# Patient Record
Sex: MALE | Race: WHITE | NOT HISPANIC OR LATINO
[De-identification: names, ages, dates, MRNs, and addresses within clinical notes are randomized per-mention and may not be internally consistent; named-entity substitution may affect disease eponyms.]

---

## 2022-07-29 PROBLEM — Z00.00 ENCOUNTER FOR PREVENTIVE HEALTH EXAMINATION: Status: ACTIVE | Noted: 2022-07-29

## 2022-08-01 ENCOUNTER — APPOINTMENT (OUTPATIENT)
Dept: ORTHOPEDIC SURGERY | Facility: CLINIC | Age: 62
End: 2022-08-01

## 2022-08-01 ENCOUNTER — FORM ENCOUNTER (OUTPATIENT)
Age: 62
End: 2022-08-01

## 2022-08-01 VITALS — BODY MASS INDEX: 35.78 KG/M2 | WEIGHT: 270 LBS | HEIGHT: 73 IN

## 2022-08-01 DIAGNOSIS — I10 ESSENTIAL (PRIMARY) HYPERTENSION: ICD-10-CM

## 2022-08-01 DIAGNOSIS — M25.539 PAIN IN UNSPECIFIED WRIST: ICD-10-CM

## 2022-08-01 PROCEDURE — 73110 X-RAY EXAM OF WRIST: CPT | Mod: LT

## 2022-08-01 PROCEDURE — 99203 OFFICE O/P NEW LOW 30 MIN: CPT

## 2022-08-01 NOTE — HISTORY OF PRESENT ILLNESS
[4] : 4 [0] : 0 [Sharp] : sharp [Intermittent] : intermittent [] : no [FreeTextEntry1] : left wrist [FreeTextEntry5] : 8/1 No injury, patient noticed a lump in his wrist and wants to get it evaluated.

## 2022-08-01 NOTE — PHYSICAL EXAM
[Right] : right hand [Volar Wrist] : volar wrist [Cystic] : cystic [Left] : left wrist [No acute displaced fracture or dislocation] : No acute displaced fracture or dislocation

## 2022-08-02 ENCOUNTER — APPOINTMENT (OUTPATIENT)
Dept: MRI IMAGING | Facility: CLINIC | Age: 62
End: 2022-08-02

## 2022-08-02 PROCEDURE — 73221 MRI JOINT UPR EXTREM W/O DYE: CPT | Mod: LT

## 2022-08-05 ENCOUNTER — APPOINTMENT (OUTPATIENT)
Dept: ORTHOPEDIC SURGERY | Facility: CLINIC | Age: 62
End: 2022-08-05

## 2022-08-05 VITALS — WEIGHT: 270 LBS | HEIGHT: 73 IN | BODY MASS INDEX: 35.78 KG/M2

## 2022-08-05 DIAGNOSIS — M67.432 GANGLION, LEFT WRIST: ICD-10-CM

## 2022-08-05 PROCEDURE — 99213 OFFICE O/P EST LOW 20 MIN: CPT

## 2022-08-05 NOTE — HISTORY OF PRESENT ILLNESS
[4] : 4 [0] : 0 [Sharp] : sharp [Intermittent] : intermittent [] : no [FreeTextEntry1] : left wrist [FreeTextEntry5] : 8/1 No injury, patient noticed a lump in his wrist and wants to get it evaluated.\par 8/5 mri: ganglion. mass has diminished in size and pain.